# Patient Record
Sex: MALE
[De-identification: names, ages, dates, MRNs, and addresses within clinical notes are randomized per-mention and may not be internally consistent; named-entity substitution may affect disease eponyms.]

---
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
## 2024-08-29 ENCOUNTER — APPOINTMENT (OUTPATIENT)
Dept: URBAN - METROPOLITAN AREA CLINIC 216 | Age: 19
Setting detail: DERMATOLOGY
End: 2024-09-01

## 2024-08-29 DIAGNOSIS — L30.8 OTHER SPECIFIED DERMATITIS: ICD-10-CM

## 2024-08-29 PROCEDURE — OTHER REASSURANCE: OTHER

## 2024-08-29 PROCEDURE — OTHER PRESCRIPTION MEDICATION MANAGEMENT: OTHER

## 2024-08-29 PROCEDURE — 99203 OFFICE O/P NEW LOW 30 MIN: CPT

## 2024-08-29 PROCEDURE — OTHER COUNSELING: OTHER

## 2024-08-29 PROCEDURE — OTHER PRESCRIPTION: OTHER

## 2024-08-29 RX ORDER — PREDNISONE 10 MG/1
TABLET ORAL
Qty: 42 | Refills: 0 | Status: ERX | COMMUNITY
Start: 2024-08-29

## 2024-08-29 RX ORDER — CLOBETASOL PROPIONATE 0.5 MG/G
CREAM TOPICAL
Qty: 30 | Refills: 1 | Status: ERX | COMMUNITY
Start: 2024-08-29

## 2024-08-29 ASSESSMENT — LOCATION DETAILED DESCRIPTION DERM
LOCATION DETAILED: RIGHT VENTRAL DISTAL FOREARM
LOCATION DETAILED: LEFT VENTRAL DISTAL FOREARM
LOCATION DETAILED: RIGHT RADIAL DORSAL HAND
LOCATION DETAILED: LEFT DORSAL WRIST

## 2024-08-29 ASSESSMENT — LOCATION ZONE DERM
LOCATION ZONE: ARM
LOCATION ZONE: HAND

## 2024-08-29 ASSESSMENT — LOCATION SIMPLE DESCRIPTION DERM
LOCATION SIMPLE: RIGHT HAND
LOCATION SIMPLE: LEFT WRIST
LOCATION SIMPLE: LEFT FOREARM
LOCATION SIMPLE: RIGHT FOREARM

## 2024-08-29 NOTE — HPI: RASH
Is This A New Presentation, Or A Follow-Up?: Rash
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

## 2025-02-21 NOTE — PROCEDURE: COUNSELING
Occupational Therapy    Visit Type: initial evaluation  SUBJECTIVE  Patient verbally agrees to allow the following to be present during session: spouse  Patient / Family Goal: maximize function    Pain     Location: left leg/ankle    At onset of session (out of 10): 7    OBJECTIVE     Cognitive Status   Orientation    - Oriented to: person, place and time  Following Direction   - follows all commands and directions consistently    Vitals:  Blood Pressure (mmhg):      - Supine: 147/84  Seated BP after standing and becoming dizzy 105/80, supine /55, RN informed of BP     Hand Dominance: right-handed      Range of Motion (ROM)   (degrees unless noted; active unless noted; norms in ( ); negative=lacking to 0, positive=beyond 0)  Comments: Bilateral shoulders WFL, left wrist drop and decreased digit range of motion.     Strength  (out of 5 unless noted, standard test position unless noted)   Gross : left  less than right      Sitting Balance  (RADHA = base of support)  Static      - Trial 1 details: independent  Dynamic      - Trial 1 details: stand by assist    Standing Balance  (RADHA = base of support)  Firm Surface: Double Leg      - Static, Eyes Open       - Trial 1 details: contact guard and with double UE support  Did not tolerate standing due to dizziness       Coordination  LUE: impaired   RUE: impaired        Bed Mobility  - Supine to sit: moderate assist  Transfers  Assistive devices: gait belt, platform attachment, 2-wheeled walker  - Sit to stand: contact guard/touching/steadying assist  - Stand to sit: contact guard/touching/steadying assist  Unsteady with standing       Functional Ambulation  - Assistance: contact guard/touching/steadying assist  - Assistive device: gait belt, 2-wheeled walker and platform attachment  - Distance (ft):2  Activities of Daily Living (ADLs)  Eating:   - Assist: modified independent  Grooming/Oral Hygiene:   - Grooming assist: minimal assist  Upper Body Dressing:  -  Assist: minimal assist  Lower Body Dressing:   - Assist: total assist - dependent  Toileting:   - Toilet transfer:        - Assist: contact guard/touching/steadying assist       - Device: gait belt, 2-wheeled walker and platform crutches       - Equipment: bedside commode  - Assist: maximal assist  Bathing:   - Assist: moderate assist     Interventions    Treatment provided: activity tolerance, balance retraining, bed mobility training, transfer training and safety training  Skilled input: verbal instruction/cues, tactile instruction/cues and posture correction  Verbal Consent: Writer verbally educated and received verbal consent for hand placement, positioning of patient, and techniques to be performed today from patient for clothing adjustments for techniques and therapist position for techniques as described above and how they are pertinent to the patient's plan of care.         Education:   - Present and ready to learn: patient  Education provided during session:  - bed mobility techniques, positioning and role of OT    ASSESSMENT   Patient will benefit from inpatient skilled therapy to address current assessed functional limitations and impairments.  Interfering components: decreased activity tolerance    Discharge needs based on today's assessment:  - Current level of function: significantly below baseline level of function  - Therapy needs at discharge: therapy 5 or more times per week  - Activities of daily living (ADLs) requiring support at discharge: bathing, bed mobility, transfers, ambulation, toileting, dressing and grooming  - Instrumental activities of daily living (IADLs) requiring support at discharge: driving, health/medication management, shopping, meal preparation, emergency responses and home management  - Impairments that require further therapy intervention: balance, pain, ROM, strength, activity tolerance and coordination  AM-PAC  - Prior Level of Function: IND/MOD I ([facility] 22-24)       Key:  Detail Level: Detailed MOD A=moderate assistance, IND/MOD I=independent/modified independent  - Generalized Current Level of Function     - Current Self-Cares: 14       Scoring Key= >21 Modified Independent; 20-21 Supervision; 18-19 Minimal assist; 13-17 Moderate assist; 9-12 Max assist; <9 Total assist        Personal Occupations Profile Affected: bathing/showering, lower body dressing, personal hygiene/grooming, upper body dressing, personal device care, functional mobility/transfers, toileting/toilet hygiene, home establishment/managements, meal preparation/cleanup      Clinical decision making: Complex/High - Patient has several limitations (5+), comorbidities and/or complexities, as noted in comprehensive assessment above, that impact their occupational profile.  Resulting in multiple treatment options and significant task modification consistent with high clinical decision making complexity.    PLAN (while hospitalized)  Suggestions for next session as indicated: Toilet transfer     OT Frequency: 3-5 x per week      PT/OT Mobility Equipment for Discharge: issued platform rolling walker  PT/OT ADL Equipment for Discharge: commode  Interventions: ADL retraining, functional transfer training, activity tolerance training, patient/family training, compensatory technique education, balance, positioning, safety training, transfer training, patient education, therapeutic activity, therapeutic exercise, compensatory techniques, bed mobility training and equipment eval/education  Agreement to plan and goals: patient agrees with goals and treatment plan      GOALS  Long Term Goals: (to be met by time of discharge from hospital)  Grooming: Patient will complete grooming tasks in sitting stand by assist.  Upper body dressing: Patient will complete upper body dressing stand by assist.  Lower body dressing: Patient will complete lower body dressing moderate assist.  Toileting: Patient will complete toileting minimal assist.  Bathing: Patient will  complete bathingminimal assist Toilet transfer: Patient will complete toilet transfer with gait belt, stand by assist.   Goal toilet transfer equipment: with DME PRN.  Documented in the chart in the following areas: Prior Function. Assessment/Plan.    Patient at End of Session:   Location: in bed  Safety measures: alarm system in place/re-engaged, call light within reach and equipment intact  Handoff to: nurse      Therapy procedure time and total treatment time can be found documented on the Time Entry flowsheet